# Patient Record
Sex: FEMALE | Race: WHITE | NOT HISPANIC OR LATINO | Employment: OTHER | URBAN - METROPOLITAN AREA
[De-identification: names, ages, dates, MRNs, and addresses within clinical notes are randomized per-mention and may not be internally consistent; named-entity substitution may affect disease eponyms.]

---

## 2017-05-05 ENCOUNTER — OFFICE VISIT (OUTPATIENT)
Dept: OTOLARYNGOLOGY | Facility: CLINIC | Age: 57
End: 2017-05-05
Payer: MEDICARE

## 2017-05-05 VITALS — RESPIRATION RATE: 16 BRPM | HEIGHT: 58 IN | WEIGHT: 136 LBS | BODY MASS INDEX: 28.55 KG/M2

## 2017-05-05 DIAGNOSIS — H61.23 IMPACTED CERUMEN, BILATERAL: Primary | ICD-10-CM

## 2017-05-05 DIAGNOSIS — Q90.9 DOWN'S SYNDROME: ICD-10-CM

## 2017-05-05 PROCEDURE — 69210 REMOVE IMPACTED EAR WAX UNI: CPT | Performed by: OTOLARYNGOLOGY

## 2017-05-05 PROCEDURE — 99212 OFFICE O/P EST SF 10 MIN: CPT | Mod: 25 | Performed by: OTOLARYNGOLOGY

## 2017-05-05 ASSESSMENT — PAIN SCALES - GENERAL: PAINLEVEL: NO PAIN (0)

## 2017-05-05 NOTE — PROGRESS NOTES
Chief Complaint - ear wax removal    History of Present Illness - Lora Hannah is a 57 year old female who returns to me today for recheck. She has Down's, and so has had issues with small ear canals and cerumen build up. Had one episode of acute otitis externa in the past, but no concerns this time. She reports no pain or drainage. Hearing seems okay.    Past Medical History -   Patient Active Problem List   Diagnosis     Impacted cerumen   Down's Syndrome  Hypothyroidism    Current Medications -   Current Outpatient Prescriptions:      LEVOTHYROXINE SODIUM 88 MCG PO TABS, 1 TABLET DAILY, Disp: , Rfl:      ABILIFY 10 MG PO TABS, 1 TABLET DAILY, Disp: , Rfl:      ACTONEL 35 MG PO TABS, 1 TABLET WEEKLY TAKEN 30 MINUTES BEFORE A MEAL, Disp: , Rfl:      TOPAMAX 25 MG PO TABS, 1 TABLET TWICE DAILY, Disp: , Rfl:      DRISDOL 13196 UNIT PO CAPS, 1 CAPSULE DAILY, Disp: , Rfl:      MULTIVITAMIN OR, None Entered, Disp: , Rfl:      FISH OIL, None Entered, Disp: , Rfl:      VITAMIN C 1000 MG PO TABS, 1 TABLET DAILY AT DINNER, Disp: , Rfl:      NEBULIZER, 0.083 % PRN, Disp: , Rfl:      FERROUS FUMARATE 324 MG PO TABS, 1 TABLET DAILY, Disp: , Rfl:      ZOLPIDEM TARTRATE 10 MG PO TABS, 1 TABLET AT BEDTIME, Disp: , Rfl:      ANTACID I PO, None Entered, Disp: , Rfl:      OXYGEN-HELIUM IN,  QHS, Disp: , Rfl:      ALLOPURINOL 100 MG PO TABS, 2 TABLETS DAILY, Disp: , Rfl:     Allergies - No Known Allergies    Social History -   History     Social History     Marital Status: Single     Spouse Name: N/A     Number of Children: N/A     Years of Education: N/A     Social History Main Topics     Smoking status: Never Smoker      Smokeless tobacco: Never Used     Alcohol Use: No     Drug Use: No     Sexual Activity: None     Other Topics Concern     None     Social History Narrative     Review of Systems - As per HPI and PMHx, otherwise 7 system review of the head and neck negative.    Physical Exam  General - The patient is nontoxic.   Quiet, but does answer simple yes and no questions and cooperates with examination appropriately.   Head and Face - Down's features  Voice and Breathing - The patient was breathing comfortably without the use of accessory muscles. There was no wheezing, stridor, or stertor.    Ears - The auricles have some microtia features, no erythema. Both ears impacted with cerumen. See procedure below. Once cleaned, canals uninfected. Tympanic membranes intact, no infection, middle ears aerated.   Eyes - Wears glasses, strabismus present. Sclera were not icteric or injected.  Neurological - Cranial nerves 2 through 12 were grossly intact. House-Brackmann grade 1 out of 6 bilaterally.     Cerumen Removal    Physical Exam and Procedure  Ears - On examination of the ears, I found that both ears were impacted with cerumen.  Therefore, I positioned the patient in the examination chair in a semi-supine position. I used the binocular surgical microscope to perform cerumen removal.  On the right side, I began by using a cerumen loop to gently lift the edges of the cerumen mass away from the walls of the external canal.  Once I did this, I was able to pull away fragments of wax and debris. I removed all the wax and debri.  The tympanic membrane was intact, no sign of perforation or middle ear effusion.    I turned my attention to the left side once again using the binocular surgical microscope to perform cerumen removal.  I began by using a cerumen loop to gently lift the edges of the cerumen mass away from the walls of the external canal.  Once I did this, I was able to pull away fragments of wax and debris. I removed all the wax and debri. The tympanic membrane was intact, no sign of perforation or middle ear effusion.        Assessment and Plan - Lora Hannah is a 57 year old female who returns for ear cleaning of cerumen impaction. No infection. Both ears cleaned. Return in 9 months or as needed.    Dale Matias  MD  Otolaryngology  [facility]

## 2017-05-05 NOTE — MR AVS SNAPSHOT
After Visit Summary   5/5/2017    Lora Hannah    MRN: [id number]           Patient Information     Date Of Birth          1960        Visit Information        Provider Department      5/5/2017 10:15 AM Dale Matias MD [facility]        Today's Diagnoses     Impacted cerumen, bilateral    -  1    Down's syndrome          Care Instructions    General Scheduling Information  To schedule your CT/MRI scan, please contact [place] [facility] at [phone] [address] [place] [address]    To schedule your Surgery, please contact our Specialty Schedulers at [phone]    ENT Clinic Locations Clinic Hours Telephone Number     [facility]  [address]  [place] [address]   Tuesday:       8:00am -- 4:00pm    Wednesday:  8:00am - 4:00pm   To schedule an appointment with   Dr. Matias,   please contact our   Specialty Scheduling Department at:     [phone]       [facility]  [address] [place] [address]   Friday:          8:00am - 4:00pm         Urgent Care Locations Clinic Hours Telephone Numbers     [facility] [place]  [address]  [place] [address]     Monday-Friday:     11:00pm - 9:00pm    Saturday-Sunday:  9:00am - 5:00pm   [phone]     [facility]  [address] [address]     Monday-Friday:      5:00pm - 9:00pm     Saturday-Sunday:  9:00am - 5:00pm   [phone]             Follow-ups after your visit        Who to contact     If you have questions or need follow up information about today's clinic visit or your schedule please contact [facility] directly at [phone].  Normal or non-critical lab and imaging results will be communicated to you by MyChart, letter or phone within 4 business days after the clinic has received the results. If you do not hear from us within 7 days, please contact the clinic through MyChart or phone. If you have a critical or abnormal lab result, we  "will notify you by phone as soon as possible.  Submit refill requests through [employer] or call your pharmacy and they will forward the refill request to us. Please allow 3 business days for your refill to be completed.          Additional Information About Your Visit        [employer] Information     [employer] lets you send messages to your doctor, view your test results, renew your prescriptions, schedule appointments and more. To sign up, go to [email or web address] . Click on \"Log in\" on the left side of the screen, which will take you to the Welcome page. Then click on \"Sign up Now\" on the right side of the page.     You will be asked to enter the access code listed below, as well as some personal information. Please follow the directions to create your username and password.     Your access code is: [email or web address]  Expires: 8/3/2017 10:35 AM     Your access code will  in 90 days. If you need help or a new code, please call your [facility] clinic or [phone].        Care EveryWhere ID     This is your Care EveryWhere ID. This could be used by other organizations to access your [facility] medical records  [id number]        Your Vitals Were     Respirations Height BMI (Body Mass Index)             16 1.467 m (4' 9.75\") 28.67 kg/m2          Blood Pressure from Last 3 Encounters:   13 110/76   12/24/10 110/74   06/22/10 114/72    Weight from Last 3 Encounters:   17 61.7 kg (136 lb)   10/07/16 59.4 kg (131 lb)   02/27/15 66.7 kg (147 lb)              We Performed the Following     [facility]        Primary Care Provider Office Phone # Fax #    Melo Harman [phone]       [address] [address]        Thank you!     Thank you for choosing [facility]  [facility] your [facility]. Our goal is always to provide you with excellent care. Hearing back from our patients is one way we can continue to improve our services. Please " take a few minutes to complete the written survey that you may receive in the mail after your visit with us. Thank you!             Your Updated Medication List - Protect others around you: Learn how to safely use, store and throw away your medicines at www.disposemymeds.org.          This list is accurate as of: 5/5/17 10:35 AM.  Always use your most recent med list.                   Brand Name Dispense Instructions for use    ABILIFY 10 MG tablet   Generic drug:  ARIPiprazole      1 TABLET DAILY       ACTONEL 35 MG tablet   Generic drug:  RISEdronate      1 TABLET WEEKLY TAKEN 30 MINUTES BEFORE A MEAL       allopurinol 100 MG tablet    ZYLOPRIM     2 TABLETS DAILY       ANTACID I PO      None Entered       ascorbic acid 1000 MG Tabs    vitamin C     1 TABLET DAILY AT DINNER       DRISDOL 94125 UNITS Caps      1 CAPSULE DAILY       ferrous fumarate 324 MG Tabs    FERRETTS     1 TABLET DAILY       FISH OIL      None Entered       levothyroxine 88 MCG tablet    SYNTHROID/LEVOTHROID     1 TABLET DAILY       MULTIVITAMIN PO      None Entered       NEBULIZER      0.083 % PRN       OXYGEN-HELIUM IN      QHS       TOPAMAX 25 MG tablet   Generic drug:  topiramate      1 TABLET TWICE DAILY       zolpidem 10 MG tablet    AMBIEN     1 TABLET AT BEDTIME

## 2017-05-05 NOTE — PATIENT INSTRUCTIONS
General Scheduling Information  To schedule your CT/MRI scan, please contact [place] Andino at [phone]   [address] [place] [address]    To schedule your Surgery, please contact our Specialty Schedulers at [phone]    ENT Clinic Locations Clinic Hours Telephone Number     [facility] [place]  [address] [place] [address]   Tuesday:       8:00am -- 4:00pm    Wednesday:  8:00am - 4:00pm   To schedule an appointment with   Dr. Matias,   please contact our   Specialty Scheduling Department at:     [phone]       [facility]  [address] [place] [address]   Friday:          8:00am - 4:00pm         Urgent Care Locations Clinic Hours Telephone Numbers     [facility]  [address] [place] [address]     Monday-Friday:     11:00pm - 9:00pm    Saturday-Sunday:  9:00am - 5:00pm   [phone]     [facility]  [address] [place] [address]     Monday-Friday:      5:00pm - 9:00pm     Saturday-Sunday:  9:00am - 5:00pm   [phone]

## 2017-09-24 ENCOUNTER — HEALTH MAINTENANCE LETTER (OUTPATIENT)
Age: 57
End: 2017-09-24

## 2017-12-15 ENCOUNTER — OFFICE VISIT (OUTPATIENT)
Dept: AUDIOLOGY | Facility: CLINIC | Age: 57
End: 2017-12-15
Payer: MEDICARE

## 2017-12-15 ENCOUNTER — OFFICE VISIT (OUTPATIENT)
Dept: OTOLARYNGOLOGY | Facility: CLINIC | Age: 57
End: 2017-12-15
Payer: MEDICARE

## 2017-12-15 VITALS — RESPIRATION RATE: 16 BRPM | TEMPERATURE: 95.3 F | BODY MASS INDEX: 28.88 KG/M2 | WEIGHT: 137 LBS

## 2017-12-15 DIAGNOSIS — H90.3 SENSORINEURAL HEARING LOSS (SNHL) OF BOTH EARS: ICD-10-CM

## 2017-12-15 DIAGNOSIS — F80.9 DEVELOPMENTAL DISORDER OF SPEECH OR LANGUAGE: Primary | ICD-10-CM

## 2017-12-15 DIAGNOSIS — H61.23 BILATERAL IMPACTED CERUMEN: Primary | ICD-10-CM

## 2017-12-15 PROCEDURE — 99207 ZZC NO CHARGE LOS: CPT | Performed by: AUDIOLOGIST

## 2017-12-15 PROCEDURE — 92555 SPEECH THRESHOLD AUDIOMETRY: CPT | Performed by: AUDIOLOGIST

## 2017-12-15 PROCEDURE — 69210 REMOVE IMPACTED EAR WAX UNI: CPT | Performed by: OTOLARYNGOLOGY

## 2017-12-15 PROCEDURE — 99207 ZZC NO CHARGE LOS: CPT | Performed by: OTOLARYNGOLOGY

## 2017-12-15 PROCEDURE — 92550 TYMPANOMETRY & REFLEX THRESH: CPT | Performed by: AUDIOLOGIST

## 2017-12-15 NOTE — NURSING NOTE
"Chief Complaint   Patient presents with     Cerumen Impaction     ear wax removal       Initial Temp 95.3  F (35.2  C) (Tympanic)  Resp 16  Wt 62.1 kg (137 lb)  BMI 28.88 kg/m2 Estimated body mass index is 28.88 kg/(m^2) as calculated from the following:    Height as of 5/5/17: 1.467 m (4' 9.75\").    Weight as of this encounter: 62.1 kg (137 lb).  Medication Reconciliation: complete     Jyoti Jameson CMA      "

## 2017-12-15 NOTE — LETTER
12/15/2017         RE: Lora Hannah  [address] [address] [facility] [address]        Dear Colleague,    Thank you for referring your patient, Lora Hannah, to the [facility]. Please see a copy of my visit note below.    Chief Complaint - hearing check and ear wax removal    History of Present Illness - Lora Hannah is a 57 year old female who returns to me today for recheck. She has Down's, and so has had issues with small ear canals and cerumen build up. No recent ear infection. She reports no pain or drainage. Hearing seems okay, but hasn't had it tested in years. She doesn't communicate very well, and hearing is difficult to assess. .    Past Medical History -   Patient Active Problem List   Diagnosis     Impacted cerumen   Down's Syndrome  Hypothyroidism    Current Medications -   Current Outpatient Prescriptions:      LEVOTHYROXINE SODIUM 88 MCG PO TABS, 1 TABLET DAILY, Disp: , Rfl:      ABILIFY 10 MG PO TABS, 1 TABLET DAILY, Disp: , Rfl:      ACTONEL 35 MG PO TABS, 1 TABLET WEEKLY TAKEN 30 MINUTES BEFORE A MEAL, Disp: , Rfl:      TOPAMAX 25 MG PO TABS, 1 TABLET TWICE DAILY, Disp: , Rfl:      DRISDOL 14049 UNIT PO CAPS, 1 CAPSULE DAILY, Disp: , Rfl:      MULTIVITAMIN OR, None Entered, Disp: , Rfl:      FISH OIL, None Entered, Disp: , Rfl:      VITAMIN C 1000 MG PO TABS, 1 TABLET DAILY AT DINNER, Disp: , Rfl:      NEBULIZER, 0.083 % PRN, Disp: , Rfl:      FERROUS FUMARATE 324 MG PO TABS, 1 TABLET DAILY, Disp: , Rfl:      ZOLPIDEM TARTRATE 10 MG PO TABS, 1 TABLET AT BEDTIME, Disp: , Rfl:      ANTACID I PO, None Entered, Disp: , Rfl:      OXYGEN-HELIUM IN,  QHS, Disp: , Rfl:      ALLOPURINOL 100 MG PO TABS, 2 TABLETS DAILY, Disp: , Rfl:     Allergies - No Known Allergies    Social History -   History     Social History     Marital Status: Single     Spouse Name: N/A     Number of Children: N/A     Years of Education: N/A     Social History Main Topics     Smoking status: Never Smoker      Smokeless  tobacco: Never Used     Alcohol Use: No     Drug Use: No     Sexual Activity: None     Other Topics Concern     None     Social History Narrative     Review of Systems - As per HPI and PMHx, otherwise 7 system review of the head and neck negative.    Physical Exam  General - The patient is nontoxic. Quiet, doesn't answer questions very easily. Cooperates with examination appropriately.   Head and Face - Down's features  Voice and Breathing - The patient was breathing comfortably without the use of accessory muscles. There was no wheezing, stridor, or stertor.    Ears - The auricles have some microtia features, no erythema. Both ears impacted with cerumen. See procedure below. Once cleaned, canals uninfected. Tympanic membranes intact, no infection, middle ears aerated.   Eyes - Wears glasses, strabismus present. Sclera were not icteric or injected.  Neurological - Cranial nerves 2 through 12 were grossly intact. House-Brackmann grade 1 out of 6 bilaterally.     Cerumen Removal    Physical Exam and Procedure  Ears - On examination of the ears, I found that both ears were impacted with cerumen.  Therefore, I positioned the patient in the examination chair in a semi-supine position. I used the binocular surgical microscope to perform cerumen removal.  On the right side, I began by using a cerumen loop to gently lift the edges of the cerumen mass away from the walls of the external canal.  Once I did this, I was able to pull away fragments of wax and debris. I removed all the wax and debri.  The tympanic membrane was intact, no sign of perforation or middle ear effusion.    I turned my attention to the left side once again using the binocular surgical microscope to perform cerumen removal.  I began by using a cerumen loop to gently lift the edges of the cerumen mass away from the walls of the external canal.  Once I did this, I was able to pull away fragments of wax and debris. I removed all the wax and debri. The  tympanic membrane was intact, no sign of perforation or middle ear effusion.    Audiogram - normal tympanograms. Patient wouldn't condition well, but seemed to respond to 20-25 dB SRT.     Assessment and Plan - Lora Hannah is a 57 year old female who returns for ear cleaning of cerumen impaction. No infection. Both ears cleaned. Seems to hear reasonably well. Return in 9 months or as needed.    Dale Matias MD  Otolaryngology  [facility]      Again, thank you for allowing me to participate in the care of your patient.        Sincerely,        Dale Matias MD

## 2017-12-15 NOTE — PROGRESS NOTES
Chief Complaint - hearing check and ear wax removal    History of Present Illness - Lora Hannah is a 57 year old female who returns to me today for recheck. She has Down's, and so has had issues with small ear canals and cerumen build up. No recent ear infection. She reports no pain or drainage. Hearing seems okay, but hasn't had it tested in years. She doesn't communicate very well, and hearing is difficult to assess. .    Past Medical History -   Patient Active Problem List   Diagnosis     Impacted cerumen   Down's Syndrome  Hypothyroidism    Current Medications -   Current Outpatient Prescriptions:      LEVOTHYROXINE SODIUM 88 MCG PO TABS, 1 TABLET DAILY, Disp: , Rfl:      ABILIFY 10 MG PO TABS, 1 TABLET DAILY, Disp: , Rfl:      ACTONEL 35 MG PO TABS, 1 TABLET WEEKLY TAKEN 30 MINUTES BEFORE A MEAL, Disp: , Rfl:      TOPAMAX 25 MG PO TABS, 1 TABLET TWICE DAILY, Disp: , Rfl:      DRISDOL 33582 UNIT PO CAPS, 1 CAPSULE DAILY, Disp: , Rfl:      MULTIVITAMIN OR, None Entered, Disp: , Rfl:      FISH OIL, None Entered, Disp: , Rfl:      VITAMIN C 1000 MG PO TABS, 1 TABLET DAILY AT DINNER, Disp: , Rfl:      NEBULIZER, 0.083 % PRN, Disp: , Rfl:      FERROUS FUMARATE 324 MG PO TABS, 1 TABLET DAILY, Disp: , Rfl:      ZOLPIDEM TARTRATE 10 MG PO TABS, 1 TABLET AT BEDTIME, Disp: , Rfl:      ANTACID I PO, None Entered, Disp: , Rfl:      OXYGEN-HELIUM IN,  QHS, Disp: , Rfl:      ALLOPURINOL 100 MG PO TABS, 2 TABLETS DAILY, Disp: , Rfl:     Allergies - No Known Allergies    Social History -   History     Social History     Marital Status: Single     Spouse Name: N/A     Number of Children: N/A     Years of Education: N/A     Social History Main Topics     Smoking status: Never Smoker      Smokeless tobacco: Never Used     Alcohol Use: No     Drug Use: No     Sexual Activity: None     Other Topics Concern     None     Social History Narrative     Review of Systems - As per HPI and PMHx, otherwise 7 system review of the head and  neck negative.    Physical Exam  General - The patient is nontoxic. Quiet, doesn't answer questions very easily. Cooperates with examination appropriately.   Head and Face - Down's features  Voice and Breathing - The patient was breathing comfortably without the use of accessory muscles. There was no wheezing, stridor, or stertor.    Ears - The auricles have some microtia features, no erythema. Both ears impacted with cerumen. See procedure below. Once cleaned, canals uninfected. Tympanic membranes intact, no infection, middle ears aerated.   Eyes - Wears glasses, strabismus present. Sclera were not icteric or injected.  Neurological - Cranial nerves 2 through 12 were grossly intact. House-Brackmann grade 1 out of 6 bilaterally.     Cerumen Removal    Physical Exam and Procedure  Ears - On examination of the ears, I found that both ears were impacted with cerumen.  Therefore, I positioned the patient in the examination chair in a semi-supine position. I used the binocular surgical microscope to perform cerumen removal.  On the right side, I began by using a cerumen loop to gently lift the edges of the cerumen mass away from the walls of the external canal.  Once I did this, I was able to pull away fragments of wax and debris. I removed all the wax and debri.  The tympanic membrane was intact, no sign of perforation or middle ear effusion.    I turned my attention to the left side once again using the binocular surgical microscope to perform cerumen removal.  I began by using a cerumen loop to gently lift the edges of the cerumen mass away from the walls of the external canal.  Once I did this, I was able to pull away fragments of wax and debris. I removed all the wax and debri. The tympanic membrane was intact, no sign of perforation or middle ear effusion.    Audiogram - normal tympanograms. Patient wouldn't condition well, but seemed to respond to 20-25 dB SRT.     Assessment and Plan - Lora ADAM Hannah is a 57 year  old female who returns for ear cleaning of cerumen impaction. No infection. Both ears cleaned. Seems to hear reasonably well. Return in 9 months or as needed.    Dale Matias MD  Otolaryngology  [facility]

## 2017-12-15 NOTE — MR AVS SNAPSHOT
After Visit Summary   12/15/2017    Lora Hannah    MRN: [id number]           Patient Information     Date Of Birth          1960        Visit Information        Provider Department      12/15/2017 10:30 AM Dale Matias MD [facility]        Today's Diagnoses     Bilateral impacted cerumen    -  1    Sensorineural hearing loss (SNHL) of both ears          Care Instructions    General Scheduling Information  To schedule your CT/MRI scan, please contact [place] [facility] at [phone] [address] [place] [address]    To schedule your Surgery, please contact our Specialty Schedulers at [phone]    ENT Clinic Locations Clinic Hours Telephone Number     [facility]  [address]  [place] [address]   Tuesday:       8:00am -- 4:00pm    Wednesday:  8:00am - 4:00pm   To schedule an appointment with   Dr. Matias,   please contact our   Specialty Scheduling Department at:     [phone]       [facility]  [address] [place] [address]   Friday:          8:00am - 4:00pm         Urgent Care Locations Clinic Hours Telephone Numbers     [facility]  [address] [place] [address]     Monday-Friday:     11:00pm - 9:00pm    Saturday-Sunday:  9:00am - 5:00pm   [phone]     [facility]  [address] [place] [address]     Monday-Friday:      5:00pm - 9:00pm     Saturday-Sunday:  9:00am - 5:00pm   [phone]                 Follow-ups after your visit        Additional Services     AUDIOLOGY ADULT REFERRAL       Your provider has referred you to: [facility]: [facility] [phone]   [email or web address]    Treatment:  Evaluation & Treatment  Specialty Testing:  Audiogram w/Tymps and Reflexes    Please be aware that coverage of these services is subject to the terms and limitations of your health insurance plan.  Call member services at your health plan with any benefit  "or coverage questions.      Please bring the following to your appointment:  >>   Any x-rays, CTs or MRIs which have been performed.  Contact the facility where they were done to arrange for  prior to your scheduled appointment.   >>   List of current medications  >>   This referral request   >>   Any documents/labs given to you for this referral                  Who to contact     If you have questions or need follow up information about today's clinic visit or your schedule please contact [facility] directly at [phone].  Normal or non-critical lab and imaging results will be communicated to you by MyChart, letter or phone within 4 business days after the clinic has received the results. If you do not hear from us within 7 days, please contact the clinic through SteadyFarehart or phone. If you have a critical or abnormal lab result, we will notify you by phone as soon as possible.  Submit refill requests through Kutenda or call your pharmacy and they will forward the refill request to us. Please allow 3 business days for your refill to be completed.          Additional Information About Your Visit        Kutenda Information     Kutenda lets you send messages to your doctor, view your test results, renew your prescriptions, schedule appointments and more. To sign up, go to [email or web address] . Click on \"Log in\" on the left side of the screen, which will take you to the Welcome page. Then click on \"Sign up Now\" on the right side of the page.     You will be asked to enter the access code listed below, as well as some personal information. Please follow the directions to create your username and password.     Your access code is: [email or web address]  Expires: 3/15/2018 11:26 AM     Your access code will  in 90 days. If you need help or a new code, please call your [facility] or [phone].        Care EveryWhere ID     This is your Care EveryWhere ID. This could be used by other " organizations to access your [place] medical records  [id number]        Your Vitals Were     Temperature Respirations BMI (Body Mass Index)             95.3  F (35.2  C) (Tympanic) 16 28.88 kg/m2          Blood Pressure from Last 3 Encounters:   03/01/13 110/76   12/24/10 110/74   06/22/10 114/72    Weight from Last 3 Encounters:   12/15/17 62.1 kg (137 lb)   05/05/17 61.7 kg (136 lb)   10/07/16 59.4 kg (131 lb)              We Performed the Following     AUDIOLOGY ADULT REFERRAL     Remove [facility]        Primary Care Provider Office Phone # Fax #    Melo Harman [phone]       [address] [facility] [address]        Equal Access to Services     ROZINA SCALES : Hadii aad ku hadasho Soomaali, waaxda luqadaha, qaybta kaalmada adeegyada, pineda harris . So [facility] [place] [phone].    ATENCIÓN: Si habla español, tiene a baumann disposición servicios gratuitos de asistencia lingüística. Llame al [phone].    We comply with applicable federal civil rights laws and [place] laws. We do not discriminate on the basis of race, color, national origin, age, disability, sex, sexual orientation, or gender identity.            Thank you!     Thank you for choosing [facility]  for your care. Our goal is always to provide you with excellent care. Hearing back from our patients is one way we can continue to improve our services. Please take a few minutes to complete the written survey that you may receive in the mail after your visit with us. Thank you!             Your Updated Medication List - Protect others around you: Learn how to safely use, store and throw away your medicines at www.disposemymeds.org.          This list is accurate as of: 12/15/17 11:26 AM.  Always use your most recent med list.                   Brand Name Dispense Instructions for use Diagnosis    ABILIFY 10 MG tablet   Generic drug:  ARIPiprazole      1 TABLET DAILY        ACTONEL  35 MG tablet   Generic drug:  RISEdronate      1 TABLET WEEKLY TAKEN 30 MINUTES BEFORE A MEAL        allopurinol 100 MG tablet    ZYLOPRIM     2 TABLETS DAILY        ANTACID I PO      None Entered        ascorbic acid 1000 MG Tabs    vitamin C     1 TABLET DAILY AT DINNER        DRISDOL 36748 UNITS Caps      1 CAPSULE DAILY        ferrous fumarate 324 MG Tabs    FERRETTS     1 TABLET DAILY        FISH OIL      None Entered        levothyroxine 88 MCG tablet    SYNTHROID/LEVOTHROID     1 TABLET DAILY        MULTIVITAMIN PO      None Entered        NEBULIZER      0.083 % PRN        OXYGEN-HELIUM IN      QHS        TOPAMAX 25 MG tablet   Generic drug:  topiramate      1 TABLET TWICE DAILY        zolpidem 10 MG tablet    AMBIEN     1 TABLET AT BEDTIME

## 2017-12-15 NOTE — MR AVS SNAPSHOT
"              After Visit Summary   12/15/2017    Lora Hannah    MRN: [id number]           Patient Information     Date Of Birth          1960        Visit Information        Provider Department      12/15/2017 10:30 AM Dilshad Childers [facility]        Today's Diagnoses     Developmental disorder of speech or language    -  1       Follow-ups after your visit        Who to contact     If you have questions or need follow up information about today's clinic visit or your schedule please contact [facility] directly at [phone].  Normal or non-critical lab and imaging results will be communicated to you by Lucidworkshart, letter or phone within 4 business days after the clinic has received the results. If you do not hear from us within 7 days, please contact the clinic through Lucidworkshart or phone. If you have a critical or abnormal lab result, we will notify you by phone as soon as possible.  Submit refill requests through Car Advisory Network or call your pharmacy and they will forward the refill request to us. Please allow 3 business days for your refill to be completed.          Additional Information About Your Visit        MyChart Information     [employer] Advisory Network lets you send messages to your doctor, view your test results, renew your prescriptions, schedule appointments and more. To sign up, go to [email or web address] . Click on \"Log in\" on the left side of the screen, which will take you to the Welcome page. Then click on \"Sign up Now\" on the right side of the page.     You will be asked to enter the access code listed below, as well as some personal information. Please follow the directions to create your username and password.     Your access code is: [email or web address]  Expires: 3/15/2018 11:26 AM     Your access code will  in 90 days. If you need help or a new code, please call your [facility] or [phone].        Care EveryWhere ID     This is your Care EveryWhere ID. This could be " used by other organizations to access your [place] medical records  [id number]         Blood Pressure from Last 3 Encounters:   03/01/13 110/76   12/24/10 110/74   06/22/10 114/72    Weight from Last 3 Encounters:   12/15/17 137 lb (62.1 kg)   05/05/17 136 lb (61.7 kg)   10/07/16 131 lb (59.4 kg)              We Performed the Following     AUDIOGRAM/TYMPANOGRAM - INTERFACE     AUDIOM THRESHOLD     TYMPANOMETRY AND REFLEX THRESHOLD MEASUREMENTS        Primary Care Provider Office Phone # Fax #    Melo Chace Harman [phone]       [address] [facility] [address]        Equal Access to Services     LUBA SCALES : Hadii colton corrales hadasho Soomaali, waaxda luqadaha, qaybta kaalmada adeegyada, pineda harris . So [facility] [phone].    ATENCIÓN: Si habla español, tiene a baumann disposición servicios gratuitos de asistencia lingüística. Llame al [phone].    We comply with applicable federal civil rights laws and [place] laws. We do not discriminate on the basis of race, color, national origin, age, disability, sex, sexual orientation, or gender identity.            Thank you!     Thank you for choosing [facility]  for your care. Our goal is always to provide you with excellent care. Hearing back from our patients is one way we can continue to improve our services. Please take a few minutes to complete the written survey that you may receive in the mail after your visit with us. Thank you!             Your Updated Medication List - Protect others around you: Learn how to safely use, store and throw away your medicines at www.disposemymeds.org.          This list is accurate as of: 12/15/17  3:34 PM.  Always use your most recent med list.                   Brand Name Dispense Instructions for use Diagnosis    ABILIFY 10 MG tablet   Generic drug:  ARIPiprazole      1 TABLET DAILY        ACTONEL 35 MG tablet   Generic drug:  RISEdronate      1 TABLET  WEEKLY TAKEN 30 MINUTES BEFORE A MEAL        allopurinol 100 MG tablet    ZYLOPRIM     2 TABLETS DAILY        ANTACID I PO      None Entered        ascorbic acid 1000 MG Tabs    vitamin C     1 TABLET DAILY AT DINNER        DRISDOL 84097 UNITS Caps      1 CAPSULE DAILY        ferrous fumarate 324 MG Tabs    FERRETTS     1 TABLET DAILY        FISH OIL      None Entered        levothyroxine 88 MCG tablet    SYNTHROID/LEVOTHROID     1 TABLET DAILY        MULTIVITAMIN PO      None Entered        NEBULIZER      0.083 % PRN        OXYGEN-HELIUM IN      QHS        TOPAMAX 25 MG tablet   Generic drug:  topiramate      1 TABLET TWICE DAILY        zolpidem 10 MG tablet    AMBIEN     1 TABLET AT BEDTIME

## 2017-12-15 NOTE — PROGRESS NOTES
AUDIOLOGY REPORT: HEARING EXAM    SUBJECTIVE:  Lora Hannah is a 57 year old female referred to audiology from ENT by Dr. Matias for a hearing examination. Patient has Down's syndrome and difficulty communicating with others.    OBJECTIVE:    Otoscopy:   RIGHT: clear ear canal   LEFT:  clear ear canal    Tympanometry:   RIGHT:  normal eardrum mobility   LEFT:   normal eardrum mobility    Acoustic Reflexes (reported by stimulus ear):   RIGHT IPSILATERAL:  absent at frequencies tested   RIGHT CONTRALATERAL: absent at frequencies tested     LEFT IPSILATERAL:  absent at frequencies tested   LEFT CONTRALATERAL: absent at frequencies tested    Thresholds:   Speech Detection Threshold:   RIGHT:  20 dB HL   LEFT:    20 dB HL    ASSESSMENT:  Developmental speech or language disorder    Discussed results with the patient's caretaker.     PLAN:  Patient was returned to ENT for follow up.     Damon Macias.  Licensed Audiologist, [place] #[id number]  [facility]  12/15/2017

## 2018-10-19 ENCOUNTER — OFFICE VISIT (OUTPATIENT)
Dept: OTOLARYNGOLOGY | Facility: CLINIC | Age: 58
End: 2018-10-19
Payer: MEDICARE

## 2018-10-19 VITALS — SYSTOLIC BLOOD PRESSURE: 100 MMHG | DIASTOLIC BLOOD PRESSURE: 54 MMHG | BODY MASS INDEX: 27.41 KG/M2 | WEIGHT: 130 LBS

## 2018-10-19 DIAGNOSIS — H61.23 IMPACTED CERUMEN, BILATERAL: Primary | ICD-10-CM

## 2018-10-19 PROCEDURE — 69210 REMOVE IMPACTED EAR WAX UNI: CPT | Performed by: OTOLARYNGOLOGY

## 2018-10-19 PROCEDURE — 99207 ZZC NO CHARGE LOS: CPT | Performed by: OTOLARYNGOLOGY

## 2018-10-19 RX ORDER — ARIPIPRAZOLE 5 MG/1
TABLET ORAL
Refills: 1 | COMMUNITY
Start: 2018-08-15

## 2018-10-19 RX ORDER — ACETAMINOPHEN 500 MG
500-1000 TABLET ORAL 2 TIMES DAILY
COMMUNITY

## 2018-10-19 RX ORDER — ALBUTEROL SULFATE 0.83 MG/ML
2.5 SOLUTION RESPIRATORY (INHALATION)
COMMUNITY
Start: 2015-01-20

## 2018-10-19 RX ORDER — AMOXICILLIN 500 MG/1
CAPSULE ORAL
Refills: 3 | COMMUNITY
Start: 2018-05-17

## 2018-10-19 RX ORDER — TRAZODONE HYDROCHLORIDE 50 MG/1
75 TABLET, FILM COATED ORAL
COMMUNITY
Start: 2017-09-20

## 2018-10-19 RX ORDER — ALLOPURINOL 100 MG/1
150 TABLET ORAL
COMMUNITY
Start: 2017-09-20

## 2018-10-19 RX ORDER — ACETAMINOPHEN 160 MG
2000 TABLET,DISINTEGRATING ORAL 2 TIMES DAILY
COMMUNITY
Start: 2013-09-17

## 2018-10-19 NOTE — PATIENT INSTRUCTIONS
General Scheduling Information  To schedule your CT/MRI scan, please contact [place] Andino at [phone]   [address] [place] [address]    To schedule your Surgery, please contact our Specialty Schedulers at [phone]    ENT Clinic Locations Clinic Hours Telephone Number     [facility]  [address] [place] [address]   Tuesday:       8:00am -- 4:00pm    Wednesday:  8:00am - 4:00pm   To schedule an appointment with   Dr. Matias,   please contact our   Specialty Scheduling Department at:     [phone]       [facility]  [address] [place] [address]   Friday:          8:00am - 4:00pm         Urgent Care Locations Clinic Hours Telephone Numbers     [facility]  [address] [place] [address]     Monday-Friday:     11:00pm - 9:00pm    Saturday-Sunday:  9:00am - 5:00pm   [phone]     [facility]  [address] [place] [address]     Monday-Friday:      5:00pm - 9:00pm     Saturday-Sunday:  9:00am - 5:00pm   [phone]

## 2018-10-19 NOTE — PROGRESS NOTES
Cerumen Removal - Patient here for ear cleaning. Denies ear pain, drainage, and infection.     Physical Exam and Procedure  Ears - On examination of the ears, I found that both ears were impacted with cerumen.  Therefore, I positioned the patient in the examination chair in a semi-supine position. I used the binocular surgical microscope to perform cerumen removal.  On the right side, I began by using a cerumen loop to gently lift the edges of the cerumen mass away from the walls of the external canal.  Once I did this, I was able to pull away fragments of wax and debris. I removed all the wax and debri. Some right canal irritation, no infection. The tympanic membrane was intact, no sign of perforation or middle ear effusion.    I turned my attention to the left side once again using the binocular surgical microscope to perform cerumen removal.  I began by using a cerumen loop to gently lift the edges of the cerumen mass away from the walls of the external canal.  Once I did this, I was able to pull away fragments of wax and debris. I removed all the wax and debri. The tympanic membrane was intact, no sign of perforation or middle ear effusion.    A/P - bilateral cerumen impaction. Both ears cleaned today in clinic. Return prn.

## 2018-10-19 NOTE — LETTER
10/19/2018         RE: Lora Hannah  [address] [facility] [address]        Dear Colleague,    Thank you for referring your patient, Lora Hannah, to the [facility]. Please see a copy of my visit note below.    Cerumen Removal - Patient here for ear cleaning. Denies ear pain, drainage, and infection.     Physical Exam and Procedure  Ears - On examination of the ears, I found that both ears were impacted with cerumen.  Therefore, I positioned the patient in the examination chair in a semi-supine position. I used the binocular surgical microscope to perform cerumen removal.  On the right side, I began by using a cerumen loop to gently lift the edges of the cerumen mass away from the walls of the external canal.  Once I did this, I was able to pull away fragments of wax and debris. I removed all the wax and debri. Some right canal irritation, no infection. The tympanic membrane was intact, no sign of perforation or middle ear effusion.    I turned my attention to the left side once again using the binocular surgical microscope to perform cerumen removal.  I began by using a cerumen loop to gently lift the edges of the cerumen mass away from the walls of the external canal.  Once I did this, I was able to pull away fragments of wax and debris. I removed all the wax and debri. The tympanic membrane was intact, no sign of perforation or middle ear effusion.    A/P - bilateral cerumen impaction. Both ears cleaned today in clinic. Return prn.       Again, thank you for allowing me to participate in the care of your patient.        Sincerely,        Dale Matias MD

## 2018-10-19 NOTE — MR AVS SNAPSHOT
After Visit Summary   10/19/2018    Lora Hannah    MRN: [id number]           Patient Information     Date Of Birth          1960        Visit Information        Provider Department      10/19/2018 10:30 AM Dale Matias MD [facility]        Today's Diagnoses     Impacted cerumen, bilateral    -  1      Care Instructions    General Scheduling Information  To schedule your CT/MRI scan, please contact [place] [facility]ino at [phone] [address] [place] [address]    To schedule your Surgery, please contact our Specialty Schedulers at [phone]    ENT Clinic Locations Clinic Hours Telephone Number     [facility]  [address]  [place] [address]   Tuesday:       8:00am -- 4:00pm    Wednesday:  8:00am - 4:00pm   To schedule an appointment with   Dr. Matias,   please contact our   Specialty Scheduling Department at:     [phone]       [facility]  [address] [place] [address]   Friday:          8:00am - 4:00pm         Urgent Care Locations Clinic Hours Telephone Numbers     [facility]  [address]  [place] [address]     Monday-Friday:     11:00pm - 9:00pm    Saturday-Sunday:  9:00am - 5:00pm   [phone]     [facility]  [address] [place] [address]     Monday-Friday:      5:00pm - 9:00pm     Saturday-Sunday:  9:00am - 5:00pm   [phone]               Follow-ups after your visit        Who to contact     If you have questions or need follow up information about today's clinic visit or your schedule please contact [facility] directly at [phone].  Normal or non-critical lab and imaging results will be communicated to you by MyChart, letter or phone within 4 business days after the clinic has received the results. If you do not hear from us within 7 days, please contact the clinic through MyChart or phone. If you have a critical or abnormal lab result, we will notify you  by phone as soon as possible.  Submit refill requests through mobiDEOS or call your pharmacy and they will forward the refill request to us. Please allow 3 business days for your refill to be completed.          Additional Information About Your Visit        Care EveryWhere ID     This is your Care EveryWhere ID. This could be used by other organizations to access your [place] medical records  [id number]        Your Vitals Were     BMI (Body Mass Index)                   27.41 kg/m2            Blood Pressure from Last 3 Encounters:   10/19/18 100/54   03/01/13 110/76   12/24/10 110/74    Weight from Last 3 Encounters:   10/19/18 59 kg (130 lb)   12/15/17 62.1 kg (137 lb)   05/05/17 61.7 kg (136 lb)              We Performed the Following     REMOVE IMPACTED CERUMEN          Today's Medication Changes          These changes are accurate as of 10/19/18 11:10 AM.  If you have any questions, ask your nurse or doctor.               These medicines have changed or have updated prescriptions.        Dose/Directions    allopurinol 100 MG tablet   Commonly known as:  ZYLOPRIM   This may have changed:  Another medication with the same name was removed. Continue taking this medication, and follow the directions you see here.   Changed by:  Dale Matias MD        Dose:  150 mg   Take 150 mg by mouth   Refills:  0       ARIPiprazole 5 MG tablet   Commonly known as:  ABILIFY   This may have changed:  Another medication with the same name was removed. Continue taking this medication, and follow the directions you see here.   Changed by:  Dale Matias MD        Refills:  1                Primary Care Provider Office Phone # Fax #    Melo Chace Harman [phone] [address] [facility] [address]        Equal Access to Services     [facility] LOYDA : Álvaro Mason, paulino moya, pineda schwartz. So [facility]  [phone].    ATENCIÓN: Si monika spivey, tiene a baumann disposición servicios gratuitos de asistencia lingüística. Kanchan vale [phone].    We comply with applicable federal civil rights laws and [place] laws. We do not discriminate on the basis of race, color, national origin, age, disability, sex, sexual orientation, or gender identity.            Thank you!     Thank you for choosing [facility]  for your care. Our goal is always to provide you with excellent care. Hearing back from our patients is one way we can continue to improve our services. Please take a few minutes to complete the written survey that you may receive in the mail after your visit with us. Thank you!             Your Updated Medication List - Protect others around you: Learn how to safely use, store and throw away your medicines at www.disposemymeds.org.          This list is accurate as of 10/19/18 11:10 AM.  Always use your most recent med list.                   Brand Name Dispense Instructions for use Diagnosis    ACTONEL 35 MG tablet   Generic drug:  RISEdronate      1 TABLET WEEKLY TAKEN 30 MINUTES BEFORE A MEAL        albuterol (2.5 MG/3ML) 0.083% neb solution      Inhale 2.5 mg into the lungs        allopurinol 100 MG tablet    ZYLOPRIM     Take 150 mg by mouth        amoxicillin 500 MG capsule    AMOXIL     For dental procedures        ANTACID I PO      None Entered        ARIPiprazole 5 MG tablet    ABILIFY          ascorbic acid 1000 MG Tabs    vitamin C     1 TABLET DAILY AT DINNER        calcium-magnesium 500-250 MG Tabs per tablet    CALMAG     Take 1 tablet by mouth 2 times daily        FISH OIL      None Entered        levothyroxine 88 MCG tablet    SYNTHROID/LEVOTHROID     1 TABLET DAILY        MULTIVITAMIN PO      None Entered        NEBULIZER      0.083 % PRN        OXYGEN-HELIUM IN      QHS        TOPAMAX 25 MG tablet   Generic drug:  topiramate      1 TABLET TWICE DAILY        traZODone 50 MG tablet     DESYREL     Take 75 mg by mouth        TYLENOL 500 MG tablet   Generic drug:  acetaminophen      Take 500-1,000 mg by mouth 2 times daily        vitamin D3 2000 units Caps      Take 2,000 Units by mouth 2 times daily        zolpidem 10 MG tablet    AMBIEN     1 TABLET AT BEDTIME

## 2019-11-22 ENCOUNTER — OFFICE VISIT (OUTPATIENT)
Dept: OTOLARYNGOLOGY | Facility: CLINIC | Age: 59
End: 2019-11-22
Payer: MEDICARE

## 2019-11-22 VITALS — DIASTOLIC BLOOD PRESSURE: 40 MMHG | SYSTOLIC BLOOD PRESSURE: 107 MMHG | HEART RATE: 61 BPM

## 2019-11-22 DIAGNOSIS — H61.23 IMPACTED CERUMEN, BILATERAL: Primary | ICD-10-CM

## 2019-11-22 PROCEDURE — 69210 REMOVE IMPACTED EAR WAX UNI: CPT | Performed by: OTOLARYNGOLOGY

## 2019-11-22 PROCEDURE — 99207 ZZC NO CHARGE LOS: CPT | Performed by: OTOLARYNGOLOGY

## 2019-11-22 NOTE — PATIENT INSTRUCTIONS
General Scheduling Information  To schedule your CT/MRI scan, please contact [place] [facility] at [phone] [address] [place] [address]    To schedule your Surgery, please contact our Specialty Schedulers at [phone]    ENT Clinic Locations Clinic Hours Telephone Number     [facility]  [address] [place] [address]   Tuesday:       8:00am -- 4:00pm    Wednesday:  8:00am - 4:00pm   To schedule an appointment with   Dr. Matias,   please contact our   Specialty Scheduling Department at:     [phone]       [facility]  [address] [place] [address]   Friday:          8:00am - 4:00pm         Urgent Care Locations Clinic Hours Telephone Numbers     [facility]  [address] [place] [address]     Monday-Friday:     11:00pm - 9:00pm    Saturday-Sunday:  9:00am - 5:00pm   [phone]     [facility]  [address] [place] [address]     Monday-Friday:      5:00pm - 9:00pm     Saturday-Sunday:  9:00am - 5:00pm   [phone]

## 2019-11-22 NOTE — LETTER
11/22/2019         RE: Lora Hannah  [address] [facility] [address]        Dear Colleague,    Thank you for referring your patient, Lora Hannah, to the [facility]. Please see a copy of my visit note below.    Cerumen Removal - Patient here for ear cleaning. Denies ear pain, drainage, and infection.     Physical Exam and Procedure  Ears - On examination of the ears, I found that both ears were impacted with cerumen.  Therefore, I positioned the patient in the examination chair in a semi-supine position. I used the binocular surgical microscope to perform cerumen removal.  On the right side, I began by using a cerumen loop to gently lift the edges of the cerumen mass away from the walls of the external canal.  Once I did this, I was able to pull away fragments of wax and debris. I removed all the wax and debri. Some right canal irritation, no infection. The tympanic membrane was intact, no sign of perforation or middle ear effusion.    I turned my attention to the left side once again using the binocular surgical microscope to perform cerumen removal.  I began by using a cerumen loop to gently lift the edges of the cerumen mass away from the walls of the external canal.  Once I did this, I was able to pull away fragments of wax and debris. I removed all the wax and debri. The tympanic membrane was intact, no sign of perforation or middle ear effusion.    A/P - bilateral cerumen impaction. Both ears cleaned today in clinic. Return prn.       Again, thank you for allowing me to participate in the care of your patient.        Sincerely,        Dale Matias MD

## 2020-11-05 NOTE — PROGRESS NOTES
Cerumen Removal - Patient here for ear cleaning. Denies ear pain, drainage, and infection. There is some concern about hearing loss, but prior attempts at audio have failed.     Physical Exam and Procedure  Ears - On examination of the ears, I found that both ears were impacted with cerumen.  Therefore, I positioned the patient in the examination chair in a semi-supine position. I used the binocular surgical microscope to perform cerumen removal.  On the right side, I began by using a cerumen loop to gently lift the edges of the cerumen mass away from the walls of the external canal.  Once I did this, I was able to pull away fragments of wax and debris. I removed all the wax and debri. Some right canal irritation, no infection. The tympanic membrane was intact, no sign of perforation or middle ear effusion.    I turned my attention to the left side once again using the binocular surgical microscope to perform cerumen removal.  I began by using a cerumen loop to gently lift the edges of the cerumen mass away from the walls of the external canal.  Once I did this, I was able to pull away fragments of wax and debris. I removed all the wax and debri. The tympanic membrane was intact, no sign of perforation or middle ear effusion.    A/P - bilateral cerumen impaction. Both ears cleaned today in clinic. Return prn.

## 2020-11-06 ENCOUNTER — OFFICE VISIT (OUTPATIENT)
Dept: OTOLARYNGOLOGY | Facility: CLINIC | Age: 60
End: 2020-11-06
Payer: MEDICARE

## 2020-11-06 VITALS
OXYGEN SATURATION: 96 % | WEIGHT: 108 LBS | HEART RATE: 90 BPM | DIASTOLIC BLOOD PRESSURE: 52 MMHG | SYSTOLIC BLOOD PRESSURE: 102 MMHG | BODY MASS INDEX: 22.77 KG/M2

## 2020-11-06 DIAGNOSIS — H61.23 IMPACTED CERUMEN, BILATERAL: Primary | ICD-10-CM

## 2020-11-06 PROCEDURE — 69210 REMOVE IMPACTED EAR WAX UNI: CPT | Performed by: OTOLARYNGOLOGY

## 2020-11-06 PROCEDURE — 99207 PR NO CHARGE LOS: CPT | Performed by: OTOLARYNGOLOGY

## 2020-11-06 NOTE — LETTER
11/6/2020         RE: Lora Hannah  [address] [facility] [address]        Dear Colleague,    Thank you for referring your patient, Lora Hannah, to the [facility]. Please see a copy of my visit note below.    Cerumen Removal - Patient here for ear cleaning. Denies ear pain, drainage, and infection. There is some concern about hearing loss, but prior attempts at audio have failed.     Physical Exam and Procedure  Ears - On examination of the ears, I found that both ears were impacted with cerumen.  Therefore, I positioned the patient in the examination chair in a semi-supine position. I used the binocular surgical microscope to perform cerumen removal.  On the right side, I began by using a cerumen loop to gently lift the edges of the cerumen mass away from the walls of the external canal.  Once I did this, I was able to pull away fragments of wax and debris. I removed all the wax and debri. Some right canal irritation, no infection. The tympanic membrane was intact, no sign of perforation or middle ear effusion.    I turned my attention to the left side once again using the binocular surgical microscope to perform cerumen removal.  I began by using a cerumen loop to gently lift the edges of the cerumen mass away from the walls of the external canal.  Once I did this, I was able to pull away fragments of wax and debris. I removed all the wax and debri. The tympanic membrane was intact, no sign of perforation or middle ear effusion.    A/P - bilateral cerumen impaction. Both ears cleaned today in clinic. Return prn.         Again, thank you for allowing me to participate in the care of your patient.        Sincerely,        Dale Matias MD

## 2020-11-06 NOTE — PATIENT INSTRUCTIONS
General Scheduling Information  To schedule your CT/MRI scan, please contact [place] [facility] at [phone]   [address] [place] [address]    To schedule your Surgery, please contact our Specialty Schedulers at [phone]    ENT Clinic Locations Clinic Hours Telephone Number     [facility]  [address] [place] [address]   Tuesday:       8:00am -- 4:00pm    Wednesday:  8:00am - 4:00pm   To schedule an appointment with   Dr. Matias,   please contact our   Specialty Scheduling Department at:     [phone]       [facility]  [address] [place] [address]   Friday:          8:00am - 4:00pm         Urgent Care Locations Clinic Hours Telephone Numbers     [facility]  [address] [place] [address]     Monday-Friday:     11:00pm - 9:00pm    Saturday-Sunday:  9:00am - 5:00pm   [phone]     [facility]  [address] [place] [address]     Monday-Friday:      5:00pm - 9:00pm     Saturday-Sunday:  9:00am - 5:00pm   [phone]

## 2022-01-07 ENCOUNTER — TELEPHONE (OUTPATIENT)
Dept: ORTHOPEDICS | Facility: CLINIC | Age: 62
End: 2022-01-07

## 2022-01-07 NOTE — TELEPHONE ENCOUNTER
Called and left message for scheduling with Dr. Matias.       Carlita Camarena MA, [facility] ......1/7/2022...8:42 AM

## 2022-01-27 ENCOUNTER — OFFICE VISIT (OUTPATIENT)
Dept: OTOLARYNGOLOGY | Facility: CLINIC | Age: 62
End: 2022-01-27
Payer: MEDICARE

## 2022-01-27 VITALS — RESPIRATION RATE: 18 BRPM | OXYGEN SATURATION: 99 % | HEART RATE: 65 BPM

## 2022-01-27 DIAGNOSIS — H61.23 IMPACTED CERUMEN, BILATERAL: Primary | ICD-10-CM

## 2022-01-27 PROCEDURE — 69210 REMOVE IMPACTED EAR WAX UNI: CPT | Performed by: OTOLARYNGOLOGY

## 2022-01-27 NOTE — LETTER
1/27/2022         RE: Lora Hannah  [address] [facility] [address]        Dear Colleague,    Thank you for referring your patient, Lora Hannah, to the [facility]. Please see a copy of my visit note below.    Cerumen Removal - Patient here for ear cleaning. There is some concern about hearing loss, but prior attempts at audio have failed.     Physical Exam and Procedure  Ears - On examination of the ears, I found that both ears were impacted with cerumen.  Therefore, I positioned the patient in the examination chair in a semi-supine position. I used the binocular surgical microscope to perform cerumen removal.  On the left side, I began by using a cerumen loop to gently lift the edges of the cerumen mass away from the walls of the external canal.  Once I did this, I was able to pull away fragments of wax and debris. I removed all the wax and debri. I also used an alligator. Some right canal irritation, no infection. The tympanic membrane was intact, no sign of perforation or middle ear effusion.    I turned my attention to the right side once again using the binocular surgical microscope to perform cerumen removal.  I began by using a cerumen loop to gently lift the edges of the cerumen mass away from the walls of the external canal. I couldn't remove all of the wax. The patient was noncompliant. I couldn't see the tympanic membrane.    A/P - bilateral cerumen impaction. Left ear cleaned, right ear cleaned some. I recommend debrox for 5-7 days then return.         Again, thank you for allowing me to participate in the care of your patient.        Sincerely,        Dale Matias MD

## 2022-01-27 NOTE — PROGRESS NOTES
Cerumen Removal - Patient here for ear cleaning. There is some concern about hearing loss, but prior attempts at audio have failed.     Physical Exam and Procedure  Ears - On examination of the ears, I found that both ears were impacted with cerumen.  Therefore, I positioned the patient in the examination chair in a semi-supine position. I used the binocular surgical microscope to perform cerumen removal.  On the left side, I began by using a cerumen loop to gently lift the edges of the cerumen mass away from the walls of the external canal.  Once I did this, I was able to pull away fragments of wax and debris. I removed all the wax and debri. I also used an alligator. Some right canal irritation, no infection. The tympanic membrane was intact, no sign of perforation or middle ear effusion.    I turned my attention to the right side once again using the binocular surgical microscope to perform cerumen removal.  I began by using a cerumen loop to gently lift the edges of the cerumen mass away from the walls of the external canal. I couldn't remove all of the wax. The patient was noncompliant. I couldn't see the tympanic membrane.    A/P - bilateral cerumen impaction. Left ear cleaned, right ear cleaned some. I recommend debrox for 5-7 days then return.

## 2023-04-27 ENCOUNTER — TRANSFERRED RECORDS (OUTPATIENT)
Dept: HEALTH INFORMATION MANAGEMENT | Facility: CLINIC | Age: 63
End: 2023-04-27
Payer: COMMERCIAL

## 2023-05-16 ENCOUNTER — HOSPITAL ENCOUNTER (OUTPATIENT)
Facility: CLINIC | Age: 63
End: 2023-05-16
Attending: STUDENT IN AN ORGANIZED HEALTH CARE EDUCATION/TRAINING PROGRAM | Admitting: STUDENT IN AN ORGANIZED HEALTH CARE EDUCATION/TRAINING PROGRAM
Payer: COMMERCIAL